# Patient Record
Sex: MALE | Race: WHITE | NOT HISPANIC OR LATINO | Employment: UNEMPLOYED | URBAN - METROPOLITAN AREA
[De-identification: names, ages, dates, MRNs, and addresses within clinical notes are randomized per-mention and may not be internally consistent; named-entity substitution may affect disease eponyms.]

---

## 2024-05-22 ENCOUNTER — HOSPITAL ENCOUNTER (EMERGENCY)
Facility: HOSPITAL | Age: 29
Discharge: HOME | End: 2024-05-22
Attending: EMERGENCY MEDICINE

## 2024-05-22 VITALS
OXYGEN SATURATION: 99 % | HEART RATE: 84 BPM | HEIGHT: 67 IN | SYSTOLIC BLOOD PRESSURE: 132 MMHG | DIASTOLIC BLOOD PRESSURE: 89 MMHG | WEIGHT: 315 LBS | BODY MASS INDEX: 49.44 KG/M2 | TEMPERATURE: 98 F | RESPIRATION RATE: 18 BRPM

## 2024-05-22 DIAGNOSIS — T14.8XXA OPEN WOUND OF SKIN: ICD-10-CM

## 2024-05-22 DIAGNOSIS — J06.9 VIRAL URI WITH COUGH: ICD-10-CM

## 2024-05-22 DIAGNOSIS — B37.2 CANDIDAL SKIN INFECTION: Primary | ICD-10-CM

## 2024-05-22 LAB
INFLUENZA A ANTIGEN, POC: NEGATIVE
INFLUENZA B ANTIGEN, POC: NEGATIVE

## 2024-05-22 PROCEDURE — 99284 EMERGENCY DEPT VISIT MOD MDM: CPT | Mod: ER

## 2024-05-22 RX ORDER — CETIRIZINE HYDROCHLORIDE 10 MG/1
10 TABLET ORAL DAILY
Qty: 30 TABLET | Refills: 0 | Status: SHIPPED | OUTPATIENT
Start: 2024-05-22 | End: 2024-06-21

## 2024-05-22 RX ORDER — DOXYCYCLINE 100 MG/1
100 CAPSULE ORAL 2 TIMES DAILY
Qty: 14 CAPSULE | Refills: 0 | Status: SHIPPED | OUTPATIENT
Start: 2024-05-22 | End: 2024-05-29

## 2024-05-22 RX ORDER — NYSTATIN 100000 [USP'U]/G
POWDER TOPICAL 2 TIMES DAILY
Qty: 60 G | Refills: 0 | Status: SHIPPED | OUTPATIENT
Start: 2024-05-22

## 2024-05-22 RX ORDER — MUPIROCIN 20 MG/G
OINTMENT TOPICAL 2 TIMES DAILY
Qty: 22 G | Refills: 0 | Status: SHIPPED | OUTPATIENT
Start: 2024-05-22

## 2024-05-22 RX ORDER — FLUTICASONE PROPIONATE 50 MCG
1 SPRAY, SUSPENSION (ML) NASAL 2 TIMES DAILY PRN
Qty: 15 G | Refills: 0 | Status: SHIPPED | OUTPATIENT
Start: 2024-05-22

## 2024-05-23 NOTE — ED PROVIDER NOTES
Encounter Date: 5/22/2024    SCRIBE #1 NOTE: I, Teodoro Petersen, am scribing for, and in the presence of,  Ana Ta PA-C.       History     Chief Complaint   Patient presents with    Influenza     C/O COUGH CONGESTION CHILLS X 2 DAYS     29 y.o. male with no pertinent PMHx, presents for emergent evaluation of URI symptoms which started x5 days ago. Patient reports that his symptoms started 5 days with a GI bug which included a full day of watery, nonbloody diarrhea.  He states the following day he started with a fever with a T-max 103.  States that he was able to tolerate fluids, although he had decreased appetite.  His GI symptoms resolved after 2 days but then he had several days of cough, nasal congestion, rhinorrhea which has persisted prompting him to report to the ER for further evaluation.  He states his die has slowly return back to normal in his tolerating PO without difficulty.  Denies any other fevers at this time.  Patient further reports of chronic painful intermittent draining lesions under bilateral breasts and skin folds of the abdomen that started 2 years ago.  Reports of intermittent purulent and/or bloody drainage.  Denies ever being evaluated in the past for this. Patient reports that he does sweat quite a bit in his skin folds but attempts to keep them dry with a paper towels. Patient has not taken any medications at this time. Patient denies fever/chills, headache, chest pain, shortness of breath, abdominal pain, nausea/vomiting/diarrhea, urinary concerns, myalgias, or any other complaints at this time.      The history is provided by the patient. No  was used.     Review of patient's allergies indicates:  No Known Allergies  No past medical history on file.  Past Surgical History:   Procedure Laterality Date    FOOT SURGERY      TONSILLECTOMY       No family history on file.  Social History     Tobacco Use    Smoking status: Never    Smokeless tobacco: Never    Substance Use Topics    Alcohol use: No    Drug use: No     Review of Systems   Constitutional:  Negative for chills and fever.   HENT:  Positive for congestion and rhinorrhea. Negative for sore throat.    Respiratory:  Positive for cough. Negative for shortness of breath and wheezing.    Cardiovascular:  Negative for chest pain.   Gastrointestinal:  Negative for abdominal pain, diarrhea, nausea and vomiting.   Genitourinary:  Negative for decreased urine volume, dysuria, hematuria and testicular pain.   Musculoskeletal:  Negative for myalgias.   Skin:  Positive for wound. Negative for rash (Lesions).   Neurological:  Negative for weakness and headaches.   Psychiatric/Behavioral: Negative.         Physical Exam     Initial Vitals [05/22/24 2050]   BP Pulse Resp Temp SpO2   (!) 148/95 89 18 98.3 °F (36.8 °C) 99 %      MAP       --         Physical Exam    Nursing note and vitals reviewed.  Constitutional: He appears well-developed and well-nourished. He is not diaphoretic. No distress.   Body mass index is 54.82 kg/m².     HENT:   Head: Normocephalic and atraumatic.   Right Ear: External ear normal.   Left Ear: External ear normal.   Mouth/Throat: Oropharynx is clear and moist. No oropharyngeal exudate, posterior oropharyngeal edema or posterior oropharyngeal erythema.   Eyes: Conjunctivae and EOM are normal.   Neck: Neck supple.   Normal range of motion.  Cardiovascular:  Normal rate, regular rhythm and normal heart sounds.           Pulmonary/Chest: Breath sounds normal. No stridor. No respiratory distress. He has no wheezes. He has no rhonchi. He has no rales.   Musculoskeletal:         General: Normal range of motion.      Cervical back: Normal range of motion and neck supple.     Neurological: He is alert and oriented to person, place, and time. He has normal strength.   Skin: No rash noted.   Many open circular draining wounds under bilateral breasts and skin folds throughout the abdomen. Some purulent  drainage and some lesions without drainage.  Patient is notably moist and yeast smelling   Psychiatric: He has a normal mood and affect. Thought content normal.         ED Course   Procedures  Labs Reviewed   POCT RAPID INFLUENZA A/B          Imaging Results    None          Medications - No data to display  Medical Decision Making  Emergent evaluation of a 29-year-old male presenting with URI symptoms x5 days and multiple purulent wounds to abdomen x2 years.    Differential Diagnosis includes, but is not limited to:  Necrotizing fasciitis, erythema multiforme, [place]-Vincent syndrome, toxic epidermal necrolysis, DIC, cellulitis, Staph scalded skin syndrome, toxic shock syndrome, secondary syphilis, abscess, osteomyelitis, septic joint, MRSA, DVT, superficial thrombophlebitis, varicose vein, drug eruption, allergic reaction/urticatia, irritant/contact dermatitis, viral exanthem, local trauma/contusion, abrasion.    Differential Diagnosis includes, but is not limited to:  Sepsis, meningitis, cavernous sinus thrombosis, nasal foreign body, otitis media/external, nasal polyp, bacterial sinusitis, allergic rhinitis, influenza, bacterial/viral pharyngitis, peritonsillar abscess, retropharyngeal abscess, bacterial/viral pneumonia.    Influenza negative.  Lungs clear on auscultation.  Vitals reassuring.  Most consistent with viral URI.  Advised supportive care.  Abdominal wounds most consistent with candidal skin infections. D/C with nystatin powder and antibiotics due to widespread wounds with purulent drainage. Emphasized the importance of keeping skin clean and dry.  Emphasized the importance of follow up with PCP.    I see no indication of an emergent process beyond that addressed during our encounter but have duly counseled the patient/family regarding the need for prompt follow-up as well as the indications that should prompt immediate return to the emergency room should new or worrisome developments occur. I  discussed the ER work up and diagnostic findings with the patient/family. The patient/family has been provided with verbal and printed directions regarding our final diagnosis(es) as well as instructions regarding use of OTC and/or Rx medications intended to manage the patient's aforementioned conditions. The patient/family verbalized an understanding. The patient/family is asked if there are any questions or concerns. We discuss the case, until all issues are addressed to the patient/family's satisfaction. Patient/family understands and is agreeable to the plan.  Patient is stable for discharge.    Amount and/or Complexity of Data Reviewed  External Data Reviewed: labs and notes.  Labs: ordered. Decision-making details documented in ED Course.    Risk  OTC drugs.  Prescription drug management.  Diagnosis or treatment significantly limited by social determinants of health.            Scribe Attestation:   Scribe #1: I performed the above scribed service and the documentation accurately describes the services I performed. I attest to the accuracy of the note.                           I, Ana Ta PA-C, personally performed the services described in this documentation. All medical record entries made by the scribe were at my direction and in my presence. I have reviewed the chart and agree that the record reflects my personal performance and is accurate and complete.      Clinical Impression:  Final diagnoses:  [J06.9] Viral URI with cough  [B37.2] Candidal skin infection (Primary)  [T14.8XXA] Open wound of skin          ED Disposition Condition    Discharge Stable          ED Prescriptions       Medication Sig Dispense Start Date End Date Auth. Provider    doxycycline (VIBRAMYCIN) 100 MG Cap () Take 1 capsule (100 mg total) by mouth 2 (two) times daily. for 7 days 14 capsule 2024 Ana Ta PA-C    mupirocin (BACTROBAN) 2 % ointment Apply topically 2 (two) times daily. 22 g  5/22/2024 -- Ana Ta PA-C    nystatin (MYCOSTATIN) powder Apply topically 2 (two) times daily. 60 g 5/22/2024 -- Ana Ta PA-C    cetirizine (ZYRTEC) 10 MG tablet Take 1 tablet (10 mg total) by mouth once daily. 30 tablet 5/22/2024 6/21/2024 Ana Ta PA-C    fluticasone propionate (FLONASE) 50 mcg/actuation nasal spray 1 spray (50 mcg total) by Each Nostril route 2 (two) times daily as needed for Rhinitis or Allergies. 15 g 5/22/2024 -- Ana Ta PA-C          Follow-up Information       Follow up With Specialties Details Why Contact [facility] ED Emergency Medicine Go to  For new or worsening symptoms [address] [address] [phone]    [facility] -  Schedule an appointment as soon as possible for a visit   [address]  [place] [address]  [phone]               Ana Ta PA-C  06/04/24 0108

## 2024-05-23 NOTE — ED NOTES
Pt taken to a room to show Ana some wounds on his trunk that he is concerned about.   He has redness and excoriation under his breast and along the skin folds of his kane rib area.  Some of the wounds are open with serous drainage.

## 2024-05-23 NOTE — DISCHARGE INSTRUCTIONS
COMPLETE ALL ANTIBIOTICS AS PRESCRIBED.  IT IS VERY IMPORTANT TO KEEP THE SKIN DRY WITHIN THE SKIN FOLDS.  USE ANTIFUNGAL POWDER AS PRESCRIBED HELP WITH DOING SO.  USE TOPICAL OINTMENT AS WELL.    Please take an over the counter antihistamine medication (Allegra/Claritin/Zyrtec) of your choice as directed for nasal congestion.     Try an over the counter decongestant like Mucinex D or Sudafed. You can buy this behind the pharmacy counter     If you do have Hypertension or palpitations, it is safe to take Coricidin HBP for relief of sinus symptoms.     If not allergic, please take over the counter Tylenol (Acetaminophen) and/or Motrin (Ibuprofen) as directed for control of pain and/or fever. You can stagger the dosing so you are taking one or the other every three hours while spacing out the Tylenol and every 6 hours and the Motrin every 6 hours.  Please follow up with your primary care doctor or specialist as needed.     Sore throat recommendations: Warm fluids, warm salt water gargles, throat lozenges, tea, honey, soup, rest, hydration.     Use over the counter flonase: one spray each nostril twice daily OR two sprays each nostril once daily.         Please return or see your primary care doctor if you develop new or worsening symptoms.   IS VERY IMPORTANT THAT YOU GET ESTABLISHED WITH A PRIMARY CARE PROVIDER.  YOU MAY VISIT [facility] TO ESTABLISH CARE.

## 2024-05-23 NOTE — ED TRIAGE NOTES
Tommie Simons Kenya Perry, a 29 y.o. male presents to the ED w/ complaint of cough, congestion and body aches and chills for 2 days.  He reports that his boss told him after feeling his back that he had pneumonia and needed to be checked out.     Triage note:  Chief Complaint   Patient presents with    Influenza     C/O COUGH CONGESTION CHILLS X 2 DAYS     Review of patient's allergies indicates:  No Known Allergies  No past medical history on file.